# Patient Record
Sex: MALE | Race: WHITE
[De-identification: names, ages, dates, MRNs, and addresses within clinical notes are randomized per-mention and may not be internally consistent; named-entity substitution may affect disease eponyms.]

---

## 2017-05-15 ENCOUNTER — APPOINTMENT (OUTPATIENT)
Dept: FAMILY MEDICINE | Facility: CLINIC | Age: 47
End: 2017-05-15

## 2017-05-15 VITALS
WEIGHT: 185 LBS | HEIGHT: 68 IN | DIASTOLIC BLOOD PRESSURE: 78 MMHG | HEART RATE: 56 BPM | SYSTOLIC BLOOD PRESSURE: 134 MMHG | BODY MASS INDEX: 28.04 KG/M2

## 2017-08-01 ENCOUNTER — APPOINTMENT (OUTPATIENT)
Dept: CARDIOLOGY | Facility: CLINIC | Age: 47
End: 2017-08-01
Payer: COMMERCIAL

## 2017-08-01 ENCOUNTER — NON-APPOINTMENT (OUTPATIENT)
Age: 47
End: 2017-08-01

## 2017-08-01 VITALS
HEART RATE: 58 BPM | DIASTOLIC BLOOD PRESSURE: 62 MMHG | OXYGEN SATURATION: 98 % | BODY MASS INDEX: 28.34 KG/M2 | HEIGHT: 68 IN | SYSTOLIC BLOOD PRESSURE: 122 MMHG | WEIGHT: 187 LBS

## 2017-08-01 DIAGNOSIS — E66.3 OVERWEIGHT: ICD-10-CM

## 2017-08-01 PROCEDURE — 93306 TTE W/DOPPLER COMPLETE: CPT

## 2017-08-01 PROCEDURE — 93000 ELECTROCARDIOGRAM COMPLETE: CPT

## 2017-08-01 PROCEDURE — 99204 OFFICE O/P NEW MOD 45 MIN: CPT | Mod: 25

## 2018-02-06 ENCOUNTER — APPOINTMENT (OUTPATIENT)
Dept: FAMILY MEDICINE | Facility: CLINIC | Age: 48
End: 2018-02-06
Payer: COMMERCIAL

## 2018-02-06 VITALS
DIASTOLIC BLOOD PRESSURE: 80 MMHG | SYSTOLIC BLOOD PRESSURE: 120 MMHG | HEIGHT: 68 IN | WEIGHT: 187 LBS | BODY MASS INDEX: 28.34 KG/M2

## 2018-02-06 PROCEDURE — 36415 COLL VENOUS BLD VENIPUNCTURE: CPT

## 2018-02-06 PROCEDURE — 99213 OFFICE O/P EST LOW 20 MIN: CPT | Mod: 25

## 2018-02-07 LAB
BASOPHILS # BLD AUTO: 0.02 K/UL
BASOPHILS NFR BLD AUTO: 0.3 %
CRP SERPL-MCNC: 0.2 MG/DL
EOSINOPHIL # BLD AUTO: 0.18 K/UL
EOSINOPHIL NFR BLD AUTO: 2.5 %
ERYTHROCYTE [SEDIMENTATION RATE] IN BLOOD BY WESTERGREN METHOD: 36 MM/HR
HCT VFR BLD CALC: 44.2 %
HGB BLD-MCNC: 14.6 G/DL
IMM GRANULOCYTES NFR BLD AUTO: 0.1 %
LYMPHOCYTES # BLD AUTO: 2.32 K/UL
LYMPHOCYTES NFR BLD AUTO: 32.3 %
MAN DIFF?: NORMAL
MCHC RBC-ENTMCNC: 31.5 PG
MCHC RBC-ENTMCNC: 33 GM/DL
MCV RBC AUTO: 95.3 FL
MONOCYTES # BLD AUTO: 0.73 K/UL
MONOCYTES NFR BLD AUTO: 10.2 %
NEUTROPHILS # BLD AUTO: 3.93 K/UL
NEUTROPHILS NFR BLD AUTO: 54.6 %
PLATELET # BLD AUTO: 325 K/UL
RBC # BLD: 4.64 M/UL
RBC # FLD: 11.1 %
WBC # FLD AUTO: 7.19 K/UL

## 2019-02-28 ENCOUNTER — APPOINTMENT (OUTPATIENT)
Dept: FAMILY MEDICINE | Facility: CLINIC | Age: 49
End: 2019-02-28
Payer: COMMERCIAL

## 2019-02-28 VITALS
BODY MASS INDEX: 27.28 KG/M2 | RESPIRATION RATE: 16 BRPM | DIASTOLIC BLOOD PRESSURE: 80 MMHG | OXYGEN SATURATION: 95 % | HEART RATE: 60 BPM | SYSTOLIC BLOOD PRESSURE: 130 MMHG | HEIGHT: 68 IN | WEIGHT: 180 LBS

## 2019-02-28 DIAGNOSIS — R07.89 OTHER CHEST PAIN: ICD-10-CM

## 2019-02-28 DIAGNOSIS — Z87.09 PERSONAL HISTORY OF OTHER DISEASES OF THE RESPIRATORY SYSTEM: ICD-10-CM

## 2019-02-28 DIAGNOSIS — R13.10 DYSPHAGIA, UNSPECIFIED: ICD-10-CM

## 2019-02-28 DIAGNOSIS — Z86.19 PERSONAL HISTORY OF OTHER INFECTIOUS AND PARASITIC DISEASES: ICD-10-CM

## 2019-02-28 PROCEDURE — 99213 OFFICE O/P EST LOW 20 MIN: CPT

## 2019-02-28 NOTE — HISTORY OF PRESENT ILLNESS
[FreeTextEntry8] : c/o trouble swallowing since tuesday morning upon waking, denies eating chicken bones or hx of similar symptoms\par non smoker

## 2019-02-28 NOTE — PHYSICAL EXAM
[No Acute Distress] : no acute distress [Well Nourished] : well nourished [Normal Sclera/Conjunctiva] : normal sclera/conjunctiva [EOMI] : extraocular movements intact [Normal Outer Ear/Nose] : the outer ears and nose were normal in appearance [Normal Oropharynx] : the oropharynx was normal [Normal TMs] : both tympanic membranes were normal [No JVD] : no jugular venous distention [Supple] : supple [No Lymphadenopathy] : no lymphadenopathy [Thyroid Normal, No Nodules] : the thyroid was normal and there were no nodules present [No Respiratory Distress] : no respiratory distress  [No Accessory Muscle Use] : no accessory muscle use [Normal Gait] : normal gait [Normal Affect] : the affect was normal [Alert and Oriented x3] : oriented to person, place, and time [Normal Insight/Judgement] : insight and judgment were intact [de-identified] : pain upon swallowing to anterior neck

## 2021-08-17 ENCOUNTER — EMERGENCY (EMERGENCY)
Facility: HOSPITAL | Age: 51
LOS: 1 days | Discharge: HOME | End: 2021-08-17
Attending: CLINIC/CENTER
Payer: SELF-PAY

## 2021-08-17 VITALS
TEMPERATURE: 98 F | RESPIRATION RATE: 18 BRPM | SYSTOLIC BLOOD PRESSURE: 115 MMHG | OXYGEN SATURATION: 96 % | HEIGHT: 68 IN | WEIGHT: 179.9 LBS | HEART RATE: 58 BPM | DIASTOLIC BLOOD PRESSURE: 73 MMHG

## 2021-08-17 PROCEDURE — 99283 EMERGENCY DEPT VISIT LOW MDM: CPT

## 2021-08-17 PROCEDURE — 73630 X-RAY EXAM OF FOOT: CPT

## 2021-08-17 PROCEDURE — 73630 X-RAY EXAM OF FOOT: CPT | Mod: 26,RT

## 2021-08-17 PROCEDURE — 99283 EMERGENCY DEPT VISIT LOW MDM: CPT | Mod: 25

## 2021-08-17 NOTE — ED PROVIDER NOTE - PHYSICAL EXAMINATION
Gen: well developed male, NAD   HEENT: NCAT, EOMI, no nasal discharge, mucous membranes moist  CV: RRR, +S1/S2, no M/R/G  Resp: CTAB, no W/R/R  GI: Abdomen soft non-distended, NTTP, no masses  MSK: Right foot/ankle: full ROM of the right ankle and toes, MS 5/5 great toe flexion with pain, 5/5 great toe extension, minimal ttp over the right great toe MTP. Pain with ambulation. Sensation to light touch intact. No open wounds, no ecchymosis, no erythema, no LE edema  Neuro: A&Ox4, following commands, moving all four extremities spontaneously  Psych: appropriate mood Gen: well developed male, NAD   HEENT: NCAT  CV: RRR, +peripheral pulses  Resp: CTA  GI: Abdomen soft non-distended  MSK: Right foot/ankle: full ROM of the right ankle and toes, MS 5/5 great toe flexion with pain, 5/5 great toe extension, minimal ttp over dorsum of R MTP. Sensation to light touch intact. No open wounds, no ecchymosis, no erythema, no or edema  Neuro: A&Ox4, following commands, moving all four extremities spontaneously  Psych: appropriate mood

## 2021-08-17 NOTE — ED PROVIDER NOTE - NSFOLLOWUPINSTRUCTIONS_ED_ALL_ED_FT
You were seen in the Emergency Department for toe pain. You do not have a fracture. You can take acetaminophen for pain.      1) Continue all previously prescribed medications as directed.    2) Follow up with your primary care physician - take copies of your results.    3) Return to the Emergency Department for worsening or persistent symptoms, and/or ANY NEW OR CONCERNING SYMPTOMS.

## 2021-08-17 NOTE — ED PROVIDER NOTE - CLINICAL SUMMARY MEDICAL DECISION MAKING FREE TEXT BOX
49 yo male with no significant PMHx presents to the ED with right great toe pain that began this morning while climbing a fence. Exam showed full strength and ROM of the right great toe, pain elicited with ambulation. Right foot XRs obtained to r/o fx. 51 yo male with no significant PMHx presents to the ED with right great toe pain that began this morning while climbing a fence. Exam showed full strength and ROM of the right great toe, pain elicited with ambulation. Right foot XRs obtained to r/o fx. Dispo home.

## 2021-08-17 NOTE — ED PROVIDER NOTE - PATIENT PORTAL LINK FT
You can access the FollowMyHealth Patient Portal offered by [facility] by registering at the following website: [email or web address]. By joining [facility]’s FollowMyHealth portal, you will also be able to view your health information using other applications (apps) compatible with our system.

## 2021-08-17 NOTE — ED PROVIDER NOTE - ATTENDING CONTRIBUTION TO CARE
Agree with above except noted:     R first toe pain s/p climbing fence. FROM of the toe, no gross deformity. pain w. ambulating. neurovascularly intact, concerning for fx vs ligament injury. will get xray and reassess.

## 2021-08-17 NOTE — ED PROVIDER NOTE - OBJECTIVE STATEMENT
49 yo male with no significant PMHx presents to the ED with right great toe pain that began this morning when he felt a "pop" while climbing a fence. Currently he describes his pain as a dull aching pain over the right great MTP that becomes sharp when he tries to walk. His symptoms improve when he is resting his right foot. He has not taken any medications for his pain at this time. 51 yo male with no significant PMHx presents to the ED with right great toe pain that began this morning when he felt a "pop" while climbing a fence. Currently he describes his pain as a dull aching pain over the right great MTP that becomes sharp when he tries to walk. His symptoms improve when he is resting his right foot. Currently improved and can bear weight. Says he would not take pain meds for this level of pain. Denies fever, cp, sob, abdominal pain, change in urine or bowel.

## 2021-08-17 NOTE — ED ADULT NURSE NOTE - OBJECTIVE STATEMENT
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 no gross deformity. able to bear weight. [id number]

## 2025-04-21 ENCOUNTER — OFFICE (OUTPATIENT)
Dept: URBAN - METROPOLITAN AREA CLINIC 12 | Facility: CLINIC | Age: 55
Setting detail: OPHTHALMOLOGY
End: 2025-04-21

## 2025-04-21 DIAGNOSIS — H52.13: ICD-10-CM

## 2025-04-21 PROCEDURE — SCREF LASIK EVAL: Performed by: OPHTHALMOLOGY

## 2025-04-21 ASSESSMENT — REFRACTION_CURRENTRX
OS_VPRISM_DIRECTION: SV
OS_CYLINDER: -0.75
OS_SPHERE: -2.00
OD_CYLINDER: -1.25
OD_AXIS: 176
OD_SPHERE: -1.50
OS_OVR_VA: 20/
OS_AXIS: 15
OD_OVR_VA: 20/
OD_VPRISM_DIRECTION: SV

## 2025-04-21 ASSESSMENT — REFRACTION_AUTOREFRACTION
OD_CYLINDER: -1.50
OD_AXIS: 11
OS_CYLINDER: -1.25
OS_SPHERE: -2.25
OS_AXIS: 8
OD_SPHERE: -1.50

## 2025-04-21 ASSESSMENT — KERATOMETRY
OS_K1POWER_DIOPTERS: 44.50
OD_K2POWER_DIOPTERS: 45.00
OS_AXISANGLE_DEGREES: 91
OD_AXISANGLE_DEGREES: 103
OD_K1POWER_DIOPTERS: 43.75
OS_K2POWER_DIOPTERS: 45.25

## 2025-04-21 ASSESSMENT — VISUAL ACUITY
OS_BCVA: 20/25+2
OD_BCVA: 20/25-1

## 2025-04-21 ASSESSMENT — TONOMETRY: OD_IOP_MMHG: 21

## 2025-04-21 ASSESSMENT — REFRACTION_MANIFEST
OD_SPHERE: -1.50
OS_VA1: 20/20-2
OD_VA1: 20/20-2
OD_CYLINDER: -1.50
OS_AXIS: 15
OS_SPHERE: -2.25
OS_CYLINDER: -1.25
OD_AXIS: 15

## 2025-04-21 ASSESSMENT — CONFRONTATIONAL VISUAL FIELD TEST (CVF)
OS_FINDINGS: FULL
OD_FINDINGS: FULL

## 2025-05-18 ENCOUNTER — OFFICE (OUTPATIENT)
Dept: URBAN - METROPOLITAN AREA CLINIC 12 | Facility: CLINIC | Age: 55
Setting detail: OPHTHALMOLOGY
End: 2025-05-18

## 2025-05-18 DIAGNOSIS — H52.13: ICD-10-CM

## 2025-05-18 PROCEDURE — SCREF LASIK EVAL: Performed by: OPHTHALMOLOGY

## 2025-05-18 ASSESSMENT — REFRACTION_CURRENTRX
OS_AXIS: 13
OD_SPHERE: -1.50
OS_VPRISM_DIRECTION: SV
OD_OVR_VA: 20/
OD_AXIS: 1
OS_SPHERE: -2.00
OD_CYLINDER: -1.25
OS_CYLINDER: -0.75
OD_VPRISM_DIRECTION: SV
OS_OVR_VA: 20/

## 2025-05-18 ASSESSMENT — KERATOMETRY
OS_K1POWER_DIOPTERS: 44.50
OD_K2POWER_DIOPTERS: 45.00
OS_K2POWER_DIOPTERS: 45.00
OS_AXISANGLE_DEGREES: 88
OD_AXISANGLE_DEGREES: 110
OD_K1POWER_DIOPTERS: 43.75

## 2025-05-18 ASSESSMENT — REFRACTION_MANIFEST
OS_SPHERE: -2.25
OS_AXIS: 010
OU_VA: 20/25-
OD_AXIS: 180
OD_VA1: 20/25-
OD_SPHERE: -1.50
OS_VA1: 20/25-
OD_CYLINDER: -1.50
OS_CYLINDER: -1.00

## 2025-05-18 ASSESSMENT — CONFRONTATIONAL VISUAL FIELD TEST (CVF)
OS_FINDINGS: FULL
OD_FINDINGS: FULL

## 2025-05-18 ASSESSMENT — REFRACTION_AUTOREFRACTION
OD_SPHERE: -1.25
OS_AXIS: 7
OD_AXIS: 9
OS_SPHERE: -2.50
OS_CYLINDER: -0.75
OD_CYLINDER: -1.50

## 2025-05-18 ASSESSMENT — TONOMETRY
OD_IOP_MMHG: 17
OS_IOP_MMHG: 17

## 2025-05-18 ASSESSMENT — VISUAL ACUITY
OD_BCVA: 20/25+2
OS_BCVA: 20/25+1

## 2025-05-21 ENCOUNTER — RX ONLY (RX ONLY)
Age: 55
End: 2025-05-21

## 2025-05-21 ENCOUNTER — OTHER LOCATION (OUTPATIENT)
Dept: URBAN - METROPOLITAN AREA LASIK CENTER 6 | Facility: LASIK CENTER | Age: 55
Setting detail: OPHTHALMOLOGY
End: 2025-05-21

## 2025-05-21 DIAGNOSIS — H52.13: ICD-10-CM

## 2025-05-21 PROCEDURE — 65760 KERATOMILEUSIS: CPT | Mod: GY,RT,LT | Performed by: OPHTHALMOLOGY

## 2025-05-21 ASSESSMENT — REFRACTION_MANIFEST
OD_VA1: 20/25-
OS_VA1: 20/25-
OU_VA: 20/25-
OD_CYLINDER: -1.50
OS_SPHERE: -2.25
OS_CYLINDER: -1.00
OD_AXIS: 180
OD_SPHERE: -1.50
OS_AXIS: 010

## 2025-05-21 ASSESSMENT — REFRACTION_AUTOREFRACTION
OD_AXIS: 9
OS_CYLINDER: -0.75
OS_SPHERE: -2.50
OD_SPHERE: -1.25
OS_AXIS: 7
OD_CYLINDER: -1.50

## 2025-05-21 ASSESSMENT — KERATOMETRY
OD_K1POWER_DIOPTERS: 43.75
OD_AXISANGLE_DEGREES: 110
OS_K1POWER_DIOPTERS: 44.50
OD_K2POWER_DIOPTERS: 45.00
OS_AXISANGLE_DEGREES: 88
OS_K2POWER_DIOPTERS: 45.00

## 2025-05-21 ASSESSMENT — REFRACTION_CURRENTRX
OS_OVR_VA: 20/
OS_CYLINDER: -0.75
OD_CYLINDER: -1.25
OD_OVR_VA: 20/
OS_SPHERE: -2.00
OD_VPRISM_DIRECTION: SV
OD_AXIS: 1
OS_VPRISM_DIRECTION: SV
OS_AXIS: 13
OD_SPHERE: -1.50

## 2025-05-21 ASSESSMENT — VISUAL ACUITY
OD_BCVA: 20/25+2
OS_BCVA: 20/25+1

## 2025-05-22 ENCOUNTER — OFFICE (OUTPATIENT)
Dept: URBAN - METROPOLITAN AREA CLINIC 12 | Facility: CLINIC | Age: 55
Setting detail: OPHTHALMOLOGY
End: 2025-05-22

## 2025-05-22 DIAGNOSIS — H52.13: ICD-10-CM

## 2025-05-22 PROCEDURE — 99024 POSTOP FOLLOW-UP VISIT: CPT | Performed by: OPHTHALMOLOGY

## 2025-05-22 ASSESSMENT — REFRACTION_AUTOREFRACTION
OD_AXIS: 036
OS_SPHERE: PLANO
OD_SPHERE: PLANO
OS_AXIS: 105
OD_CYLINDER: -0.50
OS_CYLINDER: -0.25

## 2025-05-22 ASSESSMENT — REFRACTION_MANIFEST
OS_AXIS: 010
OU_VA: 20/25-
OD_CYLINDER: -1.50
OD_SPHERE: -1.50
OS_SPHERE: -2.25
OS_CYLINDER: -1.00
OS_VA1: 20/25-
OD_AXIS: 180
OD_VA1: 20/25-

## 2025-05-22 ASSESSMENT — KERATOMETRY
OD_K1POWER_DIOPTERS: 42.50
OS_K2POWER_DIOPTERS: 42.50
OD_K2POWER_DIOPTERS: 43.00
OS_K1POWER_DIOPTERS: 42.25
OS_AXISANGLE_DEGREES: 031
OD_AXISANGLE_DEGREES: 134

## 2025-05-22 ASSESSMENT — CONFRONTATIONAL VISUAL FIELD TEST (CVF)
OD_FINDINGS: FULL
OS_FINDINGS: FULL

## 2025-05-22 ASSESSMENT — REFRACTION_CURRENTRX
OS_OVR_VA: 20/
OS_AXIS: 13
OD_OVR_VA: 20/
OD_SPHERE: -1.50
OS_CYLINDER: -0.75
OD_CYLINDER: -1.25
OS_VPRISM_DIRECTION: SV
OD_VPRISM_DIRECTION: SV
OS_SPHERE: -2.00
OD_AXIS: 1

## 2025-05-22 ASSESSMENT — VISUAL ACUITY
OS_BCVA: 20/30+2
OD_BCVA: 20/25+3

## 2025-05-29 ENCOUNTER — OFFICE (OUTPATIENT)
Dept: URBAN - METROPOLITAN AREA CLINIC 12 | Facility: CLINIC | Age: 55
Setting detail: OPHTHALMOLOGY
End: 2025-05-29

## 2025-05-29 DIAGNOSIS — H52.13: ICD-10-CM

## 2025-05-29 PROCEDURE — 99024 POSTOP FOLLOW-UP VISIT: CPT | Performed by: OPHTHALMOLOGY

## 2025-05-29 ASSESSMENT — KERATOMETRY
OD_AXISANGLE_DEGREES: 121
OS_K1POWER_DIOPTERS: 42.25
OD_K1POWER_DIOPTERS: 42.50
OS_K2POWER_DIOPTERS: 42.75
OS_AXISANGLE_DEGREES: 053
OD_K2POWER_DIOPTERS: 43.00

## 2025-05-29 ASSESSMENT — REFRACTION_AUTOREFRACTION
OS_AXIS: 178
OD_CYLINDER: -0.75
OS_SPHERE: PLANO
OD_AXIS: 018
OD_SPHERE: PLANO
OS_CYLINDER: -0.50

## 2025-05-29 ASSESSMENT — REFRACTION_CURRENTRX
OS_OVR_VA: 20/
OS_VPRISM_DIRECTION: SV
OD_OVR_VA: 20/
OD_AXIS: 1
OS_SPHERE: -2.00
OD_CYLINDER: -1.25
OD_VPRISM_DIRECTION: SV
OD_SPHERE: -1.50
OS_CYLINDER: -0.75
OS_AXIS: 13

## 2025-05-29 ASSESSMENT — REFRACTION_MANIFEST
OS_AXIS: 010
OD_AXIS: 180
OD_VA1: 20/25-
OS_VA1: 20/25-
OD_CYLINDER: -1.50
OU_VA: 20/25-
OD_SPHERE: -1.50
OS_SPHERE: -2.25
OS_CYLINDER: -1.00

## 2025-05-29 ASSESSMENT — TONOMETRY
OD_IOP_MMHG: 11
OS_IOP_MMHG: 15

## 2025-05-29 ASSESSMENT — CONFRONTATIONAL VISUAL FIELD TEST (CVF)
OS_FINDINGS: FULL
OD_FINDINGS: FULL

## 2025-05-29 ASSESSMENT — VISUAL ACUITY
OS_BCVA: 20/25+2
OD_BCVA: 20/25+3

## 2025-06-29 ENCOUNTER — OFFICE (OUTPATIENT)
Dept: URBAN - METROPOLITAN AREA CLINIC 12 | Facility: CLINIC | Age: 55
Setting detail: OPHTHALMOLOGY
End: 2025-06-29

## 2025-06-29 DIAGNOSIS — H52.13: ICD-10-CM

## 2025-06-29 PROCEDURE — 99024 POSTOP FOLLOW-UP VISIT: CPT | Performed by: OPHTHALMOLOGY

## 2025-06-29 ASSESSMENT — REFRACTION_CURRENTRX
OS_SPHERE: -2.00
OD_AXIS: 1
OS_VPRISM_DIRECTION: SV
OD_OVR_VA: 20/
OD_VPRISM_DIRECTION: SV
OS_CYLINDER: -0.75
OD_CYLINDER: -1.25
OS_OVR_VA: 20/
OD_SPHERE: -1.50
OS_AXIS: 13

## 2025-06-29 ASSESSMENT — KERATOMETRY
OS_K2POWER_DIOPTERS: 43.00
OS_AXISANGLE_DEGREES: 066
OS_K1POWER_DIOPTERS: 42.50
OD_K1POWER_DIOPTERS: 42.50
OD_K2POWER_DIOPTERS: 43.25
OD_AXISANGLE_DEGREES: 111

## 2025-06-29 ASSESSMENT — REFRACTION_AUTOREFRACTION
OD_CYLINDER: -0.75
OD_AXIS: 012
OS_CYLINDER: -0.25
OS_AXIS: 171
OD_SPHERE: PLANO
OS_SPHERE: -0.25

## 2025-06-29 ASSESSMENT — REFRACTION_MANIFEST
OU_VA: 20/25-
OS_CYLINDER: -1.00
OD_AXIS: 012
OS_VA1: 20/20
OS_CYLINDER: -0.25
OD_CYLINDER: -0.75
OS_SPHERE: -0.25
OS_AXIS: 171
OD_SPHERE: -1.50
OD_VA1: 20/25-
OS_SPHERE: -2.25
OD_SPHERE: PLANO
OD_AXIS: 180
OS_AXIS: 010
OS_VA1: 20/25-
OD_VA1: 20/20
OD_CYLINDER: -1.50

## 2025-06-29 ASSESSMENT — TONOMETRY
OD_IOP_MMHG: 14
OS_IOP_MMHG: 13

## 2025-06-29 ASSESSMENT — VISUAL ACUITY
OS_BCVA: 20/40-
OD_BCVA: 20/30-

## 2025-06-29 ASSESSMENT — CONFRONTATIONAL VISUAL FIELD TEST (CVF)
OD_FINDINGS: FULL
OS_FINDINGS: FULL